# Patient Record
Sex: MALE | Race: WHITE
[De-identification: names, ages, dates, MRNs, and addresses within clinical notes are randomized per-mention and may not be internally consistent; named-entity substitution may affect disease eponyms.]

---

## 2019-11-09 ENCOUNTER — HOSPITAL ENCOUNTER (INPATIENT)
Dept: HOSPITAL 26 - MED | Age: 64
LOS: 3 days | DRG: 280 | End: 2019-11-12
Attending: GENERAL PRACTICE | Admitting: GENERAL PRACTICE
Payer: MEDICAID

## 2019-11-09 VITALS — WEIGHT: 134.13 LBS | HEIGHT: 66 IN | BODY MASS INDEX: 21.56 KG/M2

## 2019-11-09 VITALS — SYSTOLIC BLOOD PRESSURE: 102 MMHG | DIASTOLIC BLOOD PRESSURE: 69 MMHG

## 2019-11-09 VITALS — DIASTOLIC BLOOD PRESSURE: 68 MMHG | SYSTOLIC BLOOD PRESSURE: 101 MMHG

## 2019-11-09 DIAGNOSIS — I46.9: ICD-10-CM

## 2019-11-09 DIAGNOSIS — K72.90: ICD-10-CM

## 2019-11-09 DIAGNOSIS — K70.31: Primary | ICD-10-CM

## 2019-11-09 DIAGNOSIS — E03.9: ICD-10-CM

## 2019-11-09 DIAGNOSIS — D64.9: ICD-10-CM

## 2019-11-09 DIAGNOSIS — J69.0: ICD-10-CM

## 2019-11-09 DIAGNOSIS — E87.1: ICD-10-CM

## 2019-11-09 DIAGNOSIS — E43: ICD-10-CM

## 2019-11-09 DIAGNOSIS — R65.10: ICD-10-CM

## 2019-11-09 DIAGNOSIS — D62: ICD-10-CM

## 2019-11-09 DIAGNOSIS — N17.0: ICD-10-CM

## 2019-11-09 DIAGNOSIS — K76.6: ICD-10-CM

## 2019-11-09 DIAGNOSIS — K76.7: ICD-10-CM

## 2019-11-09 DIAGNOSIS — E80.6: ICD-10-CM

## 2019-11-09 DIAGNOSIS — F43.9: ICD-10-CM

## 2019-11-09 LAB
ALBUMIN FLD-MCNC: 2 G/DL (ref 3.4–5)
AMYLASE SERPL-CCNC: 42 U/L (ref 25–115)
ANION GAP SERPL CALCULATED.3IONS-SCNC: 15.2 MMOL/L (ref 8–16)
AST SERPL-CCNC: 67 U/L (ref 15–37)
BASOPHILS # BLD AUTO: 0.1 K/UL (ref 0–0.22)
BASOPHILS NFR BLD AUTO: 0.7 % (ref 0–2)
BILIRUB SERPL-MCNC: 3.2 MG/DL (ref 0–1)
BUN SERPL-MCNC: 37 MG/DL (ref 7–18)
CHLORIDE SERPL-SCNC: 95 MMOL/L (ref 98–107)
CO2 SERPL-SCNC: 25.3 MMOL/L (ref 21–32)
CREAT SERPL-MCNC: 2.1 MG/DL (ref 0.7–1.3)
EOSINOPHIL # BLD AUTO: 0.2 K/UL (ref 0–0.4)
EOSINOPHIL NFR BLD AUTO: 2.6 % (ref 0–4)
ERYTHROCYTE [DISTWIDTH] IN BLOOD BY AUTOMATED COUNT: 14.4 % (ref 11.6–13.7)
GFR SERPL CREATININE-BSD FRML MDRD: 41 ML/MIN (ref 90–?)
GLUCOSE SERPL-MCNC: 112 MG/DL (ref 74–106)
HCT VFR BLD AUTO: 27 % (ref 36–52)
HGB BLD-MCNC: 9.4 G/DL (ref 12–18)
IRON SERPL-MCNC: 70 UG/DL (ref 50–175)
LIPASE SERPL-CCNC: 119 U/L (ref 73–393)
LIPASE SERPL-CCNC: 123 U/L (ref 73–393)
LYMPHOCYTES # BLD AUTO: 1.5 K/UL (ref 2–11.5)
LYMPHOCYTES NFR BLD AUTO: 17.7 % (ref 20.5–51.1)
MAGNESIUM SERPL-MCNC: 1.9 MG/DL (ref 1.8–2.4)
MCH RBC QN AUTO: 38 PG (ref 27–31)
MCHC RBC AUTO-ENTMCNC: 35 G/DL (ref 33–37)
MCV RBC AUTO: 107.8 FL (ref 80–94)
MONOCYTES # BLD AUTO: 1.7 K/UL (ref 0.8–1)
MONOCYTES NFR BLD AUTO: 19 % (ref 1.7–9.3)
NEUTROPHILS # BLD AUTO: 5.2 K/UL (ref 1.8–7.7)
NEUTROPHILS NFR BLD AUTO: 60 % (ref 42.2–75.2)
PHOSPHATE SERPL-MCNC: 4 MG/DL (ref 2.5–4.9)
PLATELET # BLD AUTO: 157 K/UL (ref 140–450)
POTASSIUM SERPL-SCNC: 4.5 MMOL/L (ref 3.5–5.1)
PROTHROMBIN TIME: 16.7 SECS (ref 10.8–13.4)
RBC # BLD AUTO: 2.51 MIL/UL (ref 4.2–6.1)
SODIUM SERPL-SCNC: 131 MMOL/L (ref 136–145)
T4 FREE SERPL-MCNC: 0.65 NG/DL (ref 0.76–1.46)
TIBC SERPL-MCNC: 46 UG/DL (ref 250–450)
TSH SERPL DL<=0.05 MIU/L-ACNC: 51.92 UIU/ML (ref 0.34–3.74)
WBC # BLD AUTO: 8.7 K/UL (ref 4.8–10.8)

## 2019-11-09 PROCEDURE — P9016 RBC LEUKOCYTES REDUCED: HCPCS

## 2019-11-09 PROCEDURE — C9113 INJ PANTOPRAZOLE SODIUM, VIA: HCPCS

## 2019-11-09 PROCEDURE — C1758 CATHETER, URETERAL: HCPCS

## 2019-11-09 PROCEDURE — P9046 ALBUMIN (HUMAN), 25%, 20 ML: HCPCS

## 2019-11-09 RX ADMIN — SODIUM CHLORIDE SCH MLS/HR: 9 INJECTION, SOLUTION INTRAVENOUS at 21:00

## 2019-11-09 NOTE — NUR
PT ARRIVED TO ED WITH DAUGHTER FOR C/O RIGHT ABD PAIN, APPETTIE CHANGE, AND 
ALOC X 2 WEEKS. PT ABD IS DISTENDED, ASCITIC, ADN TENDERNESS ON RIGHT LOWER 
QUAD. BS ACTIVE IN ALL QUADS. DENIES ANY N,V,D,FVER, OR CONSTIPATION. VSS. LUNG 
SOUNDS CLEAR ON RIGHT SIDE AND DIMINSHED AND CRACKLES ON LEFT SIDE. RATES PAIN 
10/10. FOLLOWS COMMANDS. Sclera SHOWS MILD YELLOW DISCOLORATION. SKIN SHOWS 
MILD JAUNDICE. SKIN IS INTACT. SKIN SHOWS BURSING ON UPPER EXTREM. RIGHT LEG 
SHOWS +3 PITTING EDEMA AND +2 PITTING EDEMA AT THE LEFT ANKLE.



NKA.



PMH: ASCITIES, LIVER Cirrhosis.

## 2019-11-09 NOTE — NUR
-------------------------------------------------------------------------------

            *** Note undone in ED - 11/09/19 at 1839 by [facility] ***             

-------------------------------------------------------------------------------

PT TRANSFER TO CT VIA WHEELCHAIR.

## 2019-11-09 NOTE — NUR
RECEIVED BEDSIDE REPORT FROM ER RN. PT IS SLEEPING. RESPONDS TO NAME. DAUGHTER JENNIFER AT 
BEDSIDE. RESPIRATIONS ARE EQUAL AND UNLABORED ON ROOM ARE. LUNG SOUNDS ARE CLEAR. C/C ABD 
PAIN AND DISTENTION X2 WEEKS. PITTING EDEMA ON SIDDHARTH LEGS R>L. SKIN IS INTACT. PER DAUGHTER PT 
DOES NOT USE OXYGEN AT HOME AND ABLE TO AMBULATE. PT AMBULATED FROM GURNEY TO BED GAIT 
UNSTEADY. AAO X1 PERSON. IV ON  LAC 20G. US ABDOMEN PENDING. MRSA OBTAINED. VS: 102/69 HR 98 
97% RA RR 16. CALL LIGHT IS WITHIN REACH. POC DISCUSSED WITH PT AND FAMILY. WILL CONTINUE TO 
MONITOR.

## 2019-11-09 NOTE — NUR
PT IS SLEEPING COMFORTABLY IN BED. RESPIRATIONS ARE EQUAL AND UNLABORED. SAFETY MEASURES ARE 
IN PLACE.

## 2019-11-09 NOTE — NUR
VSS. ROHAN MEDICATIONS GIVEN PER ORDERS. PT TOLERATED WELL. BED ALARM ON AND LOWEST POSITION. 
CALL LIGHT IS WITHIN REACH.

## 2019-11-09 NOTE — NUR
Patient will be admitted to care of DR. MONTANEZ. Admited to TELE.  Will go to 
room 126B. Belongings list completed.  Report to MARU MAGANA.

## 2019-11-10 VITALS — SYSTOLIC BLOOD PRESSURE: 119 MMHG | DIASTOLIC BLOOD PRESSURE: 84 MMHG

## 2019-11-10 VITALS — SYSTOLIC BLOOD PRESSURE: 92 MMHG | DIASTOLIC BLOOD PRESSURE: 54 MMHG

## 2019-11-10 VITALS — SYSTOLIC BLOOD PRESSURE: 99 MMHG | DIASTOLIC BLOOD PRESSURE: 54 MMHG

## 2019-11-10 VITALS — SYSTOLIC BLOOD PRESSURE: 111 MMHG | DIASTOLIC BLOOD PRESSURE: 74 MMHG

## 2019-11-10 VITALS — DIASTOLIC BLOOD PRESSURE: 59 MMHG | SYSTOLIC BLOOD PRESSURE: 90 MMHG

## 2019-11-10 VITALS — DIASTOLIC BLOOD PRESSURE: 66 MMHG | SYSTOLIC BLOOD PRESSURE: 92 MMHG

## 2019-11-10 LAB
ANION GAP SERPL CALCULATED.3IONS-SCNC: 20.6 MMOL/L (ref 8–16)
ANION GAP SERPL CALCULATED.3IONS-SCNC: 20.8 MMOL/L (ref 8–16)
APPEARANCE FLD: CLEAR
APPEARANCE SPUN FLD: CLEAR
BASOPHILS # BLD AUTO: 0 K/UL (ref 0–0.22)
BASOPHILS NFR BLD AUTO: 0.3 % (ref 0–2)
BASOPHILS NFR BLD MANUAL: 0 % (ref 0–2)
BODY FLD TYPE: (no result)
BUN SERPL-MCNC: 42 MG/DL (ref 7–18)
BUN SERPL-MCNC: 44 MG/DL (ref 7–18)
CHLORIDE SERPL-SCNC: 94 MMOL/L (ref 98–107)
CHLORIDE SERPL-SCNC: 95 MMOL/L (ref 98–107)
CHOLEST/HDLC SERPL: 7.4 {RATIO} (ref 1–4.5)
CO2 SERPL-SCNC: 20.8 MMOL/L (ref 21–32)
CO2 SERPL-SCNC: 21.9 MMOL/L (ref 21–32)
COLOR FLD: (no result)
CREAT SERPL-MCNC: 3.1 MG/DL (ref 0.7–1.3)
CREAT SERPL-MCNC: 3.5 MG/DL (ref 0.7–1.3)
EOSINOPHIL # BLD AUTO: 0 K/UL (ref 0–0.4)
EOSINOPHIL NFR BLD AUTO: 0 % (ref 0–4)
EOSINOPHIL NFR BLD MANUAL: 0 % (ref 0–4)
ERYTHROCYTE [DISTWIDTH] IN BLOOD BY AUTOMATED COUNT: 14.5 % (ref 11.6–13.7)
ERYTHROCYTE [DISTWIDTH] IN BLOOD BY AUTOMATED COUNT: 14.6 % (ref 11.6–13.7)
GFR SERPL CREATININE-BSD FRML MDRD: 23 ML/MIN (ref 90–?)
GFR SERPL CREATININE-BSD FRML MDRD: 26 ML/MIN (ref 90–?)
GLUCOSE FLD-MCNC: 71 MG/DL
GLUCOSE SERPL-MCNC: 73 MG/DL (ref 74–106)
GLUCOSE SERPL-MCNC: 77 MG/DL (ref 74–106)
HCT VFR BLD AUTO: 28.2 % (ref 36–52)
HCT VFR BLD AUTO: 29.7 % (ref 36–52)
HDLC SERPL-MCNC: 8 MG/DL (ref 40–60)
HGB BLD-MCNC: 10 G/DL (ref 12–18)
HGB BLD-MCNC: 9.5 G/DL (ref 12–18)
LDLC SERPL CALC-MCNC: 38 MG/DL (ref 60–100)
LYMPHOCYTES # BLD AUTO: 0.8 K/UL (ref 2–11.5)
LYMPHOCYTES NFR BLD AUTO: 5.7 % (ref 20.5–51.1)
LYMPHOCYTES NFR BLD MANUAL: 4 % (ref 20–46)
MCH RBC QN AUTO: 36 PG (ref 27–31)
MCH RBC QN AUTO: 37 PG (ref 27–31)
MCHC RBC AUTO-ENTMCNC: 34 G/DL (ref 33–37)
MCHC RBC AUTO-ENTMCNC: 34 G/DL (ref 33–37)
MCV RBC AUTO: 108.3 FL (ref 80–94)
MCV RBC AUTO: 109.5 FL (ref 80–94)
MONOCYTES # BLD AUTO: 0.9 K/UL (ref 0.8–1)
MONOCYTES NFR BLD AUTO: 6.5 % (ref 1.7–9.3)
MONOCYTES NFR BLD MANUAL: 10 % (ref 5–12)
NEUTROPHILS # BLD AUTO: 11.7 K/UL (ref 1.8–7.7)
NEUTROPHILS NFR BLD AUTO: 87.5 % (ref 42.2–75.2)
NEUTROPHILS NFR FLD MANUAL: 33 %
PLATELET # BLD AUTO: 153 K/UL (ref 140–450)
PLATELET # BLD AUTO: 163 K/UL (ref 140–450)
POTASSIUM SERPL-SCNC: 4.7 MMOL/L (ref 3.5–5.1)
POTASSIUM SERPL-SCNC: 5.4 MMOL/L (ref 3.5–5.1)
RBC # BLD AUTO: 2.57 MIL/UL (ref 4.2–6.1)
RBC # BLD AUTO: 2.75 MIL/UL (ref 4.2–6.1)
RBC # FLD MANUAL: 396 /CU. MM.
SODIUM SERPL-SCNC: 131 MMOL/L (ref 136–145)
SODIUM SERPL-SCNC: 132 MMOL/L (ref 136–145)
SPECIMEN VOL FLD: 1050 ML
TRIGL SERPL-MCNC: 67 MG/DL (ref 30–150)
WBC # BLD AUTO: 13 K/UL (ref 4.8–10.8)
WBC # BLD AUTO: 13.3 K/UL (ref 4.8–10.8)
WBC # FLD MANUAL: 54 /CU. MM.

## 2019-11-10 PROCEDURE — 30233N1 TRANSFUSION OF NONAUTOLOGOUS RED BLOOD CELLS INTO PERIPHERAL VEIN, PERCUTANEOUS APPROACH: ICD-10-PCS

## 2019-11-10 PROCEDURE — 0W9G3ZZ DRAINAGE OF PERITONEAL CAVITY, PERCUTANEOUS APPROACH: ICD-10-PCS

## 2019-11-10 RX ADMIN — SODIUM CHLORIDE PRN MG: 9 INJECTION, SOLUTION INTRAVENOUS at 05:53

## 2019-11-10 RX ADMIN — SODIUM CHLORIDE SCH MLS/HR: 9 INJECTION, SOLUTION INTRAVENOUS at 19:05

## 2019-11-10 RX ADMIN — DEXTROSE SCH MLS/HR: 50 INJECTION, SOLUTION INTRAVENOUS at 21:15

## 2019-11-10 RX ADMIN — TAMSULOSIN HYDROCHLORIDE SCH MG: 0.4 CAPSULE ORAL at 15:32

## 2019-11-10 RX ADMIN — LEVOTHYROXINE SODIUM SCH MG: 50 TABLET ORAL at 06:23

## 2019-11-10 NOTE — NUR
RECEIVED REPORT FROM NIGHT NURSE. PATIENT IS LYING IN BED ASLEEP, ABLE TO WAKE. IV INTACT 
AND PATENT TO LEFT AC. IVF NS INFUSING AT 30ML/HR. NO S/S OF DISTRESS NOTED. BED IN LOW 
POSITION, SAFETY MEASURES IN PLACE. CALL LIGHT WITHIN REACH.

## 2019-11-10 NOTE — NUR
ULTRASOUND GUIDED PARACENTESIS PERFORMED BY DR. CANO. CONSENT AND TIME OUT FORM DONE. 
MORPHINE AND ATIVAN GIVEN AS ORDERED NOW. WILL MONITOR PATIENT.

## 2019-11-10 NOTE — NUR
VSS: 99/54  98%RA RR 18 97.6. NOTED SIDDHARTH LOWER EXTREMITY PITTING EDEMA R>L NO CHANGE 
NOTED FROM YESTERDAY. BLADDER SCAN REVEALED 600CC  BUT NOT ACCURATE D/T ASCITES. MD MADE 
AWARE WILL ORDER CONSULT WITH DR GALEANO FOR MENDOZA INSERTION.  ALSO MD MADE AWARE OF LACTIC 
ACID TRENDING UP 7.2 H.  TO SEE PATIENT.

## 2019-11-10 NOTE — NUR
ADMINISTERED CA CHLORIDE NOW INFUSING PER ORDERS. ADMINISTERED INSULIN. BLOOD SUGAR . 
SAFETY MEASURES ARE IN PLACE. WILL CONTINUE TO MONITOR. 

-------------------------------------------------------------------------------

Addendum: 11/10/19 at 2244 by Soni Bae RN

-------------------------------------------------------------------------------

## 2019-11-10 NOTE — NUR
NOTIFIED DR. CANO, IN REGARDS TO UNSUCCESSFUL INSERTION OF MENDOZA CATHETER. RESISTANCE MET 
DURING INSERTION. DR. CANO TO REFER TO UROLOGY CONSULT.

## 2019-11-10 NOTE — NUR
DR. AGUIRRE NOTIFIED OF PATIENT'S LACTIC ACID 6.6 AND UNSUCCESSFUL URINARY STRAIGHT CATHETER. 
 WILL ORDER AND SEE PATIENT.

## 2019-11-10 NOTE — NUR
PATIENT POTASSIUM HIGH ORDERS TO ADMINISTER INSULIN. ADMINISTERED DEXTROSE PER ORDERS IVP 
WILL F/U WITH ACCU CHECK BEFORE ADMINISTERING INSULIN.

## 2019-11-10 NOTE — NUR
DR GALEANO ATTEMPT TO INSERT MENDOZA CATH THERE WAS RESISTANCE. HE THEN FOLLOWED TO USE 
UROLOGIST TRAY AND INSERT CATHETER THERE  CC URINE OUTPUT DARK ORANGE/ BLOOD TINGE. 
PT TOLERATED WELL. SAFETY MEASURES ARE IN PLACE. WILL CONTINUE TO MONITOR.  

-------------------------------------------------------------------------------

Addendum: 11/11/19 at 0107 by Soni Bae RN

-------------------------------------------------------------------------------

THERE  CC URINE OUTPUT

## 2019-11-10 NOTE — NUR
RECEIVED BEDSIDE REPORT. PT IS LAYING IN BED WITH EYES CLOSED RESPIRATIONS ARE EQUAL AND 
UNLABORED ON RA. DAY RN IS GOING TO START ALBUMIN PER ORDERS IV ON LAC 20G. ALSO DAY RN 
TRIED TO GIVE LACTULOSE BUT PT UNRESPONSIVE OPEN EYES BUT NOT TALKING HIGH RISK OF 
ASPIRATION. PT HAD A PARACENTESIS TODAY 1050L OUTPUT AT 1620 ORDER TO SEND FLUID TO LAB 
CURRENTLY IN ROOM. ORDER FOR MENDOZA CATH D/T NO URINE OUTPUT IN OVER 24H. NURSES ATTEMPT X4 
WILL F/U WITH DOCTOR.  FAMILY IS AT BEDSIDE. POC DISCUSSED WITH PT AND FAMILY. SAFETY 
MEASURES ARE IN PLACE. WILL ROUND FREQUENTLY.

## 2019-11-10 NOTE — NUR
MENDOZA CATHETER RE INSERTION ATTEMPTED, WITH DR. CANO AT BEDSIDE. UNABLE TO INSERT CATHETER AT 
THIS TIME. WILL REFER TO UROLOGIST.

## 2019-11-10 NOTE — NUR
STATUS POST PARACENTESIS. OUTPUT 1050 ML OF FLUID. PATIENT IS KEPT COMFORTABLE DURING 
PROCEDURE. WILL CONTINUE TO MONITOR PATIENT.

## 2019-11-10 NOTE — NUR
VITAL SIGNS ARE WITHIN NORMAL LIMITS. ALL SAFETY MEASURES ARE IN PLACE. CALL LIGHT IS WITHIN 
REACH.

## 2019-11-11 VITALS — SYSTOLIC BLOOD PRESSURE: 93 MMHG | DIASTOLIC BLOOD PRESSURE: 56 MMHG

## 2019-11-11 VITALS — SYSTOLIC BLOOD PRESSURE: 105 MMHG | DIASTOLIC BLOOD PRESSURE: 59 MMHG

## 2019-11-11 VITALS — SYSTOLIC BLOOD PRESSURE: 94 MMHG | DIASTOLIC BLOOD PRESSURE: 57 MMHG

## 2019-11-11 VITALS — SYSTOLIC BLOOD PRESSURE: 101 MMHG | DIASTOLIC BLOOD PRESSURE: 56 MMHG

## 2019-11-11 VITALS — DIASTOLIC BLOOD PRESSURE: 52 MMHG | SYSTOLIC BLOOD PRESSURE: 86 MMHG

## 2019-11-11 VITALS — DIASTOLIC BLOOD PRESSURE: 62 MMHG | SYSTOLIC BLOOD PRESSURE: 94 MMHG

## 2019-11-11 VITALS — DIASTOLIC BLOOD PRESSURE: 57 MMHG | SYSTOLIC BLOOD PRESSURE: 96 MMHG

## 2019-11-11 VITALS — DIASTOLIC BLOOD PRESSURE: 55 MMHG | SYSTOLIC BLOOD PRESSURE: 107 MMHG

## 2019-11-11 VITALS — SYSTOLIC BLOOD PRESSURE: 100 MMHG | DIASTOLIC BLOOD PRESSURE: 47 MMHG

## 2019-11-11 VITALS — DIASTOLIC BLOOD PRESSURE: 59 MMHG | SYSTOLIC BLOOD PRESSURE: 92 MMHG

## 2019-11-11 VITALS — DIASTOLIC BLOOD PRESSURE: 71 MMHG | SYSTOLIC BLOOD PRESSURE: 98 MMHG

## 2019-11-11 VITALS — SYSTOLIC BLOOD PRESSURE: 118 MMHG | DIASTOLIC BLOOD PRESSURE: 94 MMHG

## 2019-11-11 VITALS — DIASTOLIC BLOOD PRESSURE: 57 MMHG | SYSTOLIC BLOOD PRESSURE: 81 MMHG

## 2019-11-11 LAB
ANION GAP SERPL CALCULATED.3IONS-SCNC: 17.1 MMOL/L (ref 8–16)
APPEARANCE UR: CLEAR
BARBITURATES UR QL SCN: (no result) NG/ML
BASOPHILS # BLD AUTO: 0 K/UL (ref 0–0.22)
BASOPHILS NFR BLD AUTO: 0.4 % (ref 0–2)
BENZODIAZ UR QL SCN: (no result) NG/ML
BILIRUB UR QL STRIP: (no result)
BUN SERPL-MCNC: 46 MG/DL (ref 7–18)
BZE UR QL SCN: (no result) NG/ML
CANNABINOIDS UR QL SCN: (no result) NG/ML
CHLORIDE SERPL-SCNC: 96 MMOL/L (ref 98–107)
CO2 SERPL-SCNC: 23.7 MMOL/L (ref 21–32)
COLOR UR: (no result)
CREAT SERPL-MCNC: 3.8 MG/DL (ref 0.7–1.3)
EOSINOPHIL # BLD AUTO: 0.1 K/UL (ref 0–0.4)
EOSINOPHIL NFR BLD AUTO: 0.8 % (ref 0–4)
ERYTHROCYTE [DISTWIDTH] IN BLOOD BY AUTOMATED COUNT: 14.7 % (ref 11.6–13.7)
ERYTHROCYTE [DISTWIDTH] IN BLOOD BY AUTOMATED COUNT: 19 % (ref 11.6–13.7)
FOLATE SERPL-MCNC: 5.2 NG/ML (ref 3–?)
GFR SERPL CREATININE-BSD FRML MDRD: 21 ML/MIN (ref 90–?)
GLUCOSE SERPL-MCNC: 92 MG/DL (ref 74–106)
GLUCOSE UR STRIP-MCNC: NEGATIVE MG/DL
HCT VFR BLD AUTO: 20.8 % (ref 36–52)
HCT VFR BLD AUTO: 28.3 % (ref 36–52)
HGB BLD-MCNC: 7.3 G/DL (ref 12–18)
HGB BLD-MCNC: 9.7 G/DL (ref 12–18)
HGB UR QL STRIP: (no result)
HYALINE CASTS URNS QL MICRO: (no result) /LPF
LEUKOCYTE ESTERASE UR QL STRIP: NEGATIVE
LYMPHOCYTES # BLD AUTO: 1.3 K/UL (ref 2–11.5)
LYMPHOCYTES NFR BLD AUTO: 15.2 % (ref 20.5–51.1)
LYMPHOCYTES NFR BLD MANUAL: 12 % (ref 20–46)
MAGNESIUM SERPL-MCNC: 2 MG/DL (ref 1.8–2.4)
MCH RBC QN AUTO: 35 PG (ref 27–31)
MCH RBC QN AUTO: 38 PG (ref 27–31)
MCHC RBC AUTO-ENTMCNC: 34 G/DL (ref 33–37)
MCHC RBC AUTO-ENTMCNC: 35 G/DL (ref 33–37)
MCV RBC AUTO: 102.2 FL (ref 80–94)
MCV RBC AUTO: 107.7 FL (ref 80–94)
MONOCYTES # BLD AUTO: 1.4 K/UL (ref 0.8–1)
MONOCYTES NFR BLD AUTO: 16.2 % (ref 1.7–9.3)
MONOCYTES NFR BLD MANUAL: 3 % (ref 5–12)
NEUTROPHILS # BLD AUTO: 5.8 K/UL (ref 1.8–7.7)
NEUTROPHILS NFR BLD AUTO: 67.4 % (ref 42.2–75.2)
NITRITE UR QL STRIP: NEGATIVE
NRBC BLD MANUAL-RTO: 1 /100WBC (ref 0–0)
OPIATES UR QL SCN: (no result) NG/ML
PCP UR QL SCN: (no result) NG/ML
PH UR STRIP: 5 [PH] (ref 5–9)
PHOSPHATE SERPL-MCNC: 4.9 MG/DL (ref 2.5–4.9)
PLATELET # BLD AUTO: 126 K/UL (ref 140–450)
PLATELET # BLD AUTO: 129 K/UL (ref 140–450)
POTASSIUM SERPL-SCNC: 4.8 MMOL/L (ref 3.5–5.1)
RBC # BLD AUTO: 1.93 MIL/UL (ref 4.2–6.1)
RBC # BLD AUTO: 2.77 MIL/UL (ref 4.2–6.1)
RBC #/AREA URNS HPF: (no result) /HPF (ref 0–5)
SODIUM SERPL-SCNC: 132 MMOL/L (ref 136–145)
WBC # BLD AUTO: 10.1 K/UL (ref 4.8–10.8)
WBC # BLD AUTO: 8.6 K/UL (ref 4.8–10.8)
WBC,URINE: (no result) /HPF (ref 0–5)

## 2019-11-11 RX ADMIN — DEXTROSE SCH MLS/HR: 50 INJECTION, SOLUTION INTRAVENOUS at 13:20

## 2019-11-11 RX ADMIN — MIDODRINE HYDROCHLORIDE SCH MG: 5 TABLET ORAL at 18:48

## 2019-11-11 RX ADMIN — LEVOTHYROXINE SODIUM SCH MG: 50 TABLET ORAL at 06:30

## 2019-11-11 RX ADMIN — IPRATROPIUM BROMIDE AND ALBUTEROL SULFATE SCH ML: .5; 3 SOLUTION RESPIRATORY (INHALATION) at 13:23

## 2019-11-11 RX ADMIN — METOCLOPRAMIDE SCH MG: 5 INJECTION, SOLUTION INTRAMUSCULAR; INTRAVENOUS at 18:48

## 2019-11-11 RX ADMIN — SODIUM CHLORIDE SCH MLS/HR: 9 INJECTION, SOLUTION INTRAVENOUS at 18:48

## 2019-11-11 RX ADMIN — MIDODRINE HYDROCHLORIDE SCH MG: 5 TABLET ORAL at 06:47

## 2019-11-11 RX ADMIN — LACTULOSE SCH GM: 10 SOLUTION ORAL at 16:17

## 2019-11-11 RX ADMIN — PANTOPRAZOLE SODIUM SCH MG: 40 INJECTION, POWDER, FOR SOLUTION INTRAVENOUS at 21:08

## 2019-11-11 RX ADMIN — MIDODRINE HYDROCHLORIDE SCH MG: 5 TABLET ORAL at 15:36

## 2019-11-11 RX ADMIN — DEXTROSE SCH MLS/HR: 50 INJECTION, SOLUTION INTRAVENOUS at 04:06

## 2019-11-11 RX ADMIN — LACTULOSE SCH GM: 10 SOLUTION ORAL at 21:09

## 2019-11-11 RX ADMIN — METOCLOPRAMIDE SCH MG: 5 INJECTION, SOLUTION INTRAMUSCULAR; INTRAVENOUS at 23:40

## 2019-11-11 NOTE — NUR
11/11/19 RD INITIAL ASSESSMENT COMPLETED



PLEASE REFER TO NUTRITION ASSESSMENT UNDER CARE ACTIVITY FOR ESTIMATED NUTRITIONAL NEEDS. 



1. RECOMMEND NEPRO @45 ML/HR 

-THIS WILL PROVIDE 1080 ML OF VOLUME, 1944 KCAL AND 87.5 GM OF PROTEIN WHICH MEETS 100% OF 
ESTIMATED NEEDS

2. RECOMMEND FREE WATER FLUSH 55 ML Q6H

3. IF/WHEN PATIENT BECOMES ALERT AND CONSCIOUS, RECOMMEND A SWALLOW EVALUATION TO ADVANCE TO 
A PO DIET

4. RD TO FOLLOW-UP 2-3 DAYS, HIGH RISK 



JENNIFER GAMEZ RD

## 2019-11-11 NOTE — NUR
ORAL CARE PROVIDED, PATIENT IS MOANING/GROANING AND SHUTTING MOUTH-WITHDRAWING TO LIGHT 
PAIN. PATIENT CANNOT OPEN EYES OR FOLLOW SIMPLE COMMANDS.

## 2019-11-11 NOTE — NUR
PT TAKEN TO ICU FOR CLOSER OBSERVATION. PT TO BE TRANSFERRED TO OTHER FACILITY FOR HIGHER 
LEVEL OF CARE. PT FAMILY STATED THAT PT BREATHING WAS BETTER YESTERDAY. MD NOTIFIED OF 
CHANGES. DR. HERRMANN SO=POKE WITH FAMILY OF PT CONDITION. ENDORSED TO ICU NURSE KANDICE FOR 
CONTINUITY OF CARE. PT VITALS STABLE UPON TRANSFER TO ICU AND HR/O2 SAT STABLE ON ROUTE TO 
ICU.

## 2019-11-11 NOTE — NUR
BLOOD TRANSFUSION COMPLETE, LINE FLUSHED WITH NORMAL SALINE, IV SITE FLUSHED AND SALINE 
LOCKED, BLOOD BAG AND LINES DISCARDED VIA BIOHAZARD WASTE. NO SIGNS OF REACTION.

## 2019-11-11 NOTE — NUR
D/T ALOC AND LETHARGIC HELD ROHAN PO MEDICATIONS. WILL INFORM DOCTOR. SAFETY MEASURES ARE IN 
PLACE. WILL CONTINUE TO MONITOR.

## 2019-11-11 NOTE — NUR
PATIENT HAS BEEN SCREENED AND CATEGORIZED AS HIGH NUTRITION RISK. PATIENT WILL BE SEEN 
WITHIN 1-2 DAYS OF ADMISSION.



11/11/19



JENNIFER GAMEZ RD

## 2019-11-11 NOTE — NUR
Assessment/Discharge Plan



High Risk DC Screen 

Yes

 Name: 

Jillian Torres

Home Tel: 

[phone]

Relationship: 

daughter

Pre-Admission Living Arrangements: 

Lives with Other

Other: 

daughter: Meghna Wall

Prior ADL 

 Independent

Current Home Health Name/Tel: 

N/A

Current DME/02 Name/Tel: 

N/A

Current Hospice Name/Tel: 

N/A

Current Dialysis Name/Tel: 

N/A

Healthcare Decision Maker: 

Patient

Advance Directive 

No

Tentative Discharge Plan Summary: 

Patient is a 64 year old male with history of cirrhosis. I met with 

patient's family (patient's daughters Susanne Wall and Jillian Torres; 

patient's wife Dot Wall). Due to patient's condition, he is not able 

to participate during assessment. Patient lives at home with his daughter 

Meghna. Patient's family reported they were informed by MD patient has 

poor prognosis. Patient's pcp is Brittney Pringle ([place]). Prior to 

hospital admission patient was independent with ADLs and did not use any 

DME. Patient's family stated patient does not have an Advance Directive. 

They are aware of MD's order for higher of level care transfer. I provided 

them with my contact information.  and/or  will 

follow up as needed.   

 Signature: 

Elizabeth Reyes, MSW

Date: 

Nov 11, 2019

## 2019-11-11 NOTE — NUR
IN TO CHECK PT. UPDATED PT'S CONDITION. NOTIFIED PT DOES NOT HAVE URINE DAYSHIFT. 
NIGHT SHIFT URINE OUTPUT 120 CC TOTAL. DR. UGALDE TALKING TO PT'S FAMILY AT BEDSIDE.

## 2019-11-11 NOTE — NUR
PT STILL UNRESPONSIVE TO NAME OR LIGHT PAIN STIMULI. ON O2 NC 2L/MIN, O2 SATS 95-98%. 
BEDSIDE MONITOR STILL SHOWS ST 103S. FAMILY AT BEDSIDE.

## 2019-11-11 NOTE — NUR
NOTIFIED DR. MARTINS PT BP 86/52, MAP SHOWS 68 ON MONITOR. PER DR. MARTINS, CALL HIM ONCE PT MAP 
LESS THAN 60.

## 2019-11-11 NOTE — NUR
AIR CHECK/ AUSCULTATION COMPLETE FOR NGT PLACEMENT, SCHEDULED MEDICATIONS ADMINISTERED, NO 
RESIDUALS AT THIS TIME, PATIENT TOLERATING FEEDING, TUBE FEEDING INCREASED TO 40ML/HR.

## 2019-11-11 NOTE — NUR
DC PLANNING 

64 YRS OLD MALE PT WAS ADMITTED FROM HOME WITH A DX OF ABDOMINAL PAIN AND CIRRHOSIS, AND 
LETHARGIC. ULTRA SOUND OF ABDOMEN LARGE TO MODERATE SIZE ASCITES , PARACENTESIS DONE . IV 
ZOSYN IVPB GIVEN . D/C PLAN TO TRANSFER TO HIGHER LEVEL OF CARE FOR HEPATORENAL SYNDROME FOR 
LIVER SPECIALIST. FAXED TO [facility] AND [facility].  

-------------------------------------------------------------------------------

Addendum: 11/12/19 at 1655 by Tami Ernandez CM

-------------------------------------------------------------------------------

DC PLANNING 11/11/12 1300 

RECEIVED A CALL FROM [facility] SPOKE WITH TANI , STATED NO LIVER SPECIALIST ACCEPTED THE 
PT AT THIS TIME AND ASKING WHETHER NEEDS LIVER TRANSPLANT.

## 2019-11-11 NOTE — NUR
TRANSFERRED PT FROM FLOOR TO ICU BY BRITTNEY. PT IS UNRESPONSIVE TO NAME STIMULI. DOES NOT 
OPEN EYES. BEDSIDE MONITOR SHOWS [id number]. LUNG SOUND WHEEZING. O2 SATS SHOWS 82%, GAVE OXY 
6L/MIN INCREASED TO 92-97%. ABD SOFT. PER FLOOR NURSE, PT HAD PARACENTESIS OUTPUT 1050 
YESTERDAY.  PT HAS IV TO LEFT AC # 20. F/C IN PLACE, NO URINE NOTED IN URINE BAG. PT RLE HAS 
EDEMA NOTED. AFTER TRANSFERRED PT TO ROOM, PT HAD SMALL AMOUNT OF VOMITING( COFFEE GROUND), 
SUCTIONED PT.HOB ELEVATED 30 DEGREES WITH LOW BED POSITION, WILL CONTINUE TO MONITOR PT. 

-------------------------------------------------------------------------------

Addendum: 11/11/19 at 1035 by Maurice Villagran RN

-------------------------------------------------------------------------------

PT BP 87/55 WHEN PT GOT TO ICU.

## 2019-11-11 NOTE — NUR
RECEIVED BEDSIDE REPORT. PT IS LAYING IN BED WITH EYES CLOSED RESPIRATIONS LOUD PT MAKING 
SNORING NOISES. O2 AT 95% ON RA. PT UNRESPONSIVE TO NAME AND ONLY MAKES EYE MOVEMENTS TO 
PAIN. PT HAD A PARACENTESIS YESTERDAY WITH 1050L OUTPUT. MENDOZA INSERTED BY UROLOGIST LAST 
NIGHT. PT ONLY HAS  OUTPUT SO FAR. MD MADE AWARE OF CHANGES IN LOC PER NIGHT SHIFT. 
PT IV IN THE L FA 20G INFUSING 50ML/HR. PT ON A CCHO 60GM DIET BUT PT UNABLE TO EAT DUE TO 
ASPIRATION POSSIBLE. WILL CONTINUE TO ROUND ON PT. BED IN LOW POSITION, CALL LIGHT WITHIN 
REACH.

## 2019-11-11 NOTE — NUR
VSS : 118/94,  RR 20 97% RA. RECHECKED . PT REMAINS ALOC. OPENS EYES TO VOICE 
BUT NONVERBAL. WILL CONTINUE TO MONITOR.

## 2019-11-11 NOTE — NUR
RECEIVED PATIENT IN BED, DOES NOT RESPOND TO VOICE OR STERNAL RUB, ANOx0. LETHARGIC, EYES 
ARE REACTIVE, SLUGGISH 3MM, PERRL. SKIN WARM AND DRY, AFEBRILE. NASAL CANNULA IN PLACE TO 
2LPM, SATURATION GREATER THAN 95%, RESPIRATIONS ARE EVEN AND UNLABORED, RR 20. LUNG SOUNDS 
ARE DIMINISHED. S1,S2 SINUS TACHYCARDIA ON MONITOR, HEART RATE 105 BPM, BLOOD PRESSURE 
94/62. BLOOD TRANSFUSION CURRENTLY IN PLACE, LEFT AC PERIPHERAL IV, 20 G, FLUSHED AND 
PATENT, INFUSING BLOOD, LEFT FOREARM PERIPHERAL IV 22G, FLUSHED WITHOUT SYMPTOMS, INFUSING 
0.45 NS @ 50ML/HR. ABDOMEN IS FIRM AND DISTENDED, BOWEL SOUNDS ARE ACTIVE, RECTAL TUBE IN 
PLACE. MENDOZA CATHETER IN PLACE. LOWER EXTREMITIES ARE EDEMATOUS +1 PITTING. BED IS LOCKED 
AND IN LOW POSITION, SIDERAILS UP, CALL LIGHT WITHIN REACH. WILL CONTINUE TO MONITOR

-------------------------------------------------------------------------------

Addendum: 11/11/19 at 2232 by Jazmin Crystal RN

-------------------------------------------------------------------------------

INFUSING SANDOSTATIN @ 10ML/HR

## 2019-11-11 NOTE — NUR
VITAL SIGNS ARE STABLE; 101/56  92% RA RR 20 TEMP 97 .6. WALKED INTO ROOM PT HAD BLANK 
STARE WAS STARING AT CEILING. CALLED OUT PT'S NAME NO REACTION. SHOOK PT SHOULDER AND OPEN 
EYES WIDER. NO VERBAL RESPONSE OR LOOKED AT ME. CONTINUES TO STARE AT CEILING. MD MADE 
AWARE. PER DOCTOR WILL TREND LABS IN THE AM. WILL CONTINUE TO MONITOR CLOSELY.

## 2019-11-12 VITALS — DIASTOLIC BLOOD PRESSURE: 62 MMHG | SYSTOLIC BLOOD PRESSURE: 99 MMHG

## 2019-11-12 VITALS — SYSTOLIC BLOOD PRESSURE: 104 MMHG | DIASTOLIC BLOOD PRESSURE: 66 MMHG

## 2019-11-12 VITALS — DIASTOLIC BLOOD PRESSURE: 73 MMHG | SYSTOLIC BLOOD PRESSURE: 101 MMHG

## 2019-11-12 VITALS — SYSTOLIC BLOOD PRESSURE: 112 MMHG | DIASTOLIC BLOOD PRESSURE: 84 MMHG

## 2019-11-12 VITALS — DIASTOLIC BLOOD PRESSURE: 70 MMHG | SYSTOLIC BLOOD PRESSURE: 216 MMHG

## 2019-11-12 VITALS — SYSTOLIC BLOOD PRESSURE: 130 MMHG | DIASTOLIC BLOOD PRESSURE: 72 MMHG

## 2019-11-12 VITALS — SYSTOLIC BLOOD PRESSURE: 130 MMHG | DIASTOLIC BLOOD PRESSURE: 79 MMHG

## 2019-11-12 VITALS — SYSTOLIC BLOOD PRESSURE: 99 MMHG | DIASTOLIC BLOOD PRESSURE: 63 MMHG

## 2019-11-12 VITALS — DIASTOLIC BLOOD PRESSURE: 71 MMHG | SYSTOLIC BLOOD PRESSURE: 111 MMHG

## 2019-11-12 VITALS — DIASTOLIC BLOOD PRESSURE: 76 MMHG | SYSTOLIC BLOOD PRESSURE: 184 MMHG

## 2019-11-12 VITALS — DIASTOLIC BLOOD PRESSURE: 80 MMHG | SYSTOLIC BLOOD PRESSURE: 134 MMHG

## 2019-11-12 VITALS — SYSTOLIC BLOOD PRESSURE: 173 MMHG | DIASTOLIC BLOOD PRESSURE: 75 MMHG

## 2019-11-12 LAB
ANION GAP SERPL CALCULATED.3IONS-SCNC: 16.2 MMOL/L (ref 8–16)
ANION GAP SERPL CALCULATED.3IONS-SCNC: 17.9 MMOL/L (ref 8–16)
BUN SERPL-MCNC: 49 MG/DL (ref 7–18)
BUN SERPL-MCNC: 50 MG/DL (ref 7–18)
CHLORIDE SERPL-SCNC: 100 MMOL/L (ref 98–107)
CHLORIDE SERPL-SCNC: 98 MMOL/L (ref 98–107)
CO2 SERPL-SCNC: 24.4 MMOL/L (ref 21–32)
CO2 SERPL-SCNC: 24.4 MMOL/L (ref 21–32)
CREAT SERPL-MCNC: 4.7 MG/DL (ref 0.7–1.3)
CREAT SERPL-MCNC: 4.9 MG/DL (ref 0.7–1.3)
EOSINOPHIL NFR BLD MANUAL: 3 % (ref 0–4)
ERYTHROCYTE [DISTWIDTH] IN BLOOD BY AUTOMATED COUNT: 19.3 % (ref 11.6–13.7)
ERYTHROCYTE [DISTWIDTH] IN BLOOD BY AUTOMATED COUNT: 19.7 % (ref 11.6–13.7)
ERYTHROCYTE [DISTWIDTH] IN BLOOD BY AUTOMATED COUNT: 19.8 % (ref 11.6–13.7)
FERRITIN SERPL-MCNC: 1412 NG/ML (ref 30–400)
GFR SERPL CREATININE-BSD FRML MDRD: 16 ML/MIN (ref 90–?)
GFR SERPL CREATININE-BSD FRML MDRD: 16 ML/MIN (ref 90–?)
GLUCOSE SERPL-MCNC: 151 MG/DL (ref 74–106)
GLUCOSE SERPL-MCNC: 194 MG/DL (ref 74–106)
HCT VFR BLD AUTO: 27.6 % (ref 36–52)
HCT VFR BLD AUTO: 27.7 % (ref 36–52)
HCT VFR BLD AUTO: 29.1 % (ref 36–52)
HGB BLD-MCNC: 10 G/DL (ref 12–18)
HGB BLD-MCNC: 9.6 G/DL (ref 12–18)
HGB BLD-MCNC: 9.6 G/DL (ref 12–18)
LYMPHOCYTES NFR BLD MANUAL: 12 % (ref 20–46)
LYMPHOCYTES NFR BLD MANUAL: 17 % (ref 20–46)
LYMPHOCYTES NFR BLD MANUAL: 18 % (ref 20–46)
MAGNESIUM SERPL-MCNC: 2 MG/DL (ref 1.8–2.4)
MCH RBC QN AUTO: 35 PG (ref 27–31)
MCHC RBC AUTO-ENTMCNC: 34 G/DL (ref 33–37)
MCHC RBC AUTO-ENTMCNC: 35 G/DL (ref 33–37)
MCHC RBC AUTO-ENTMCNC: 35 G/DL (ref 33–37)
MCV RBC AUTO: 101 FL (ref 80–94)
MCV RBC AUTO: 101.9 FL (ref 80–94)
MCV RBC AUTO: 102.4 FL (ref 80–94)
MONOCYTES NFR BLD MANUAL: 10 % (ref 5–12)
MONOCYTES NFR BLD MANUAL: 10 % (ref 5–12)
MONOCYTES NFR BLD MANUAL: 15 % (ref 5–12)
NRBC BLD MANUAL-RTO: 1 /100WBC (ref 0–0)
NRBC BLD MANUAL-RTO: 1 /100WBC (ref 0–0)
PHOSPHATE SERPL-MCNC: 3.5 MG/DL (ref 2.5–4.9)
PLATELET # BLD AUTO: 118 K/UL (ref 140–450)
PLATELET # BLD AUTO: 122 K/UL (ref 140–450)
PLATELET # BLD AUTO: 126 K/UL (ref 140–450)
POTASSIUM SERPL-SCNC: 4.3 MMOL/L (ref 3.5–5.1)
POTASSIUM SERPL-SCNC: 4.6 MMOL/L (ref 3.5–5.1)
PROTHROMBIN TIME: 20.3 SECS (ref 10.8–13.4)
RBC # BLD AUTO: 2.71 MIL/UL (ref 4.2–6.1)
RBC # BLD AUTO: 2.73 MIL/UL (ref 4.2–6.1)
RBC # BLD AUTO: 2.86 MIL/UL (ref 4.2–6.1)
SODIUM SERPL-SCNC: 134 MMOL/L (ref 136–145)
SODIUM SERPL-SCNC: 138 MMOL/L (ref 136–145)
VIT B12 SERPL-MCNC: > 2000 PG/ML (ref 211–946)
WBC # BLD AUTO: 8.7 K/UL (ref 4.8–10.8)
WBC # BLD AUTO: 9 K/UL (ref 4.8–10.8)
WBC # BLD AUTO: 9.5 K/UL (ref 4.8–10.8)

## 2019-11-12 RX ADMIN — SODIUM CHLORIDE SCH MLS/HR: 9 INJECTION, SOLUTION INTRAVENOUS at 01:28

## 2019-11-12 RX ADMIN — MIDODRINE HYDROCHLORIDE SCH MG: 5 TABLET ORAL at 13:27

## 2019-11-12 RX ADMIN — PANTOPRAZOLE SODIUM SCH MG: 40 INJECTION, POWDER, FOR SOLUTION INTRAVENOUS at 08:18

## 2019-11-12 RX ADMIN — METOCLOPRAMIDE SCH MG: 5 INJECTION, SOLUTION INTRAMUSCULAR; INTRAVENOUS at 11:35

## 2019-11-12 RX ADMIN — LACTULOSE SCH GM: 10 SOLUTION ORAL at 08:17

## 2019-11-12 RX ADMIN — IPRATROPIUM BROMIDE AND ALBUTEROL SULFATE SCH ML: .5; 3 SOLUTION RESPIRATORY (INHALATION) at 13:00

## 2019-11-12 RX ADMIN — SODIUM CHLORIDE PRN MG: 9 INJECTION, SOLUTION INTRAVENOUS at 13:27

## 2019-11-12 RX ADMIN — LACTULOSE SCH GM: 10 SOLUTION ORAL at 13:27

## 2019-11-12 RX ADMIN — MIDODRINE HYDROCHLORIDE SCH MG: 5 TABLET ORAL at 06:34

## 2019-11-12 RX ADMIN — METOCLOPRAMIDE SCH MG: 5 INJECTION, SOLUTION INTRAMUSCULAR; INTRAVENOUS at 06:34

## 2019-11-12 NOTE — NUR
DR MONTENEGRO DISCUSSED CODE STATUS WITH PT'S DAUGHTER AND FAMILY. FAMILY DECIDED WITH DNR AND 
COMFORT MEASURES ONLY.

## 2019-11-12 NOTE — NUR
TALKED FOR PT DAUGHTER, PT'S WIFE AND OTHER FAMILY MEMBERS. [facility] CELL [phone]. FAX [phone]

## 2019-11-12 NOTE — NUR
G TUBE RESIDUAL 450ML, MADE DR MONTENEGRO AWARE. ASKED DR MONTENEGRO IF WE SHOULD PUT ALL OF IT BACK, DR MONTENEGRO AGREED.

## 2019-11-12 NOTE — NUR
RECEIVED BEDSIDE REPORT FROM NIGHT SHIFT RNTERESA. PT IN BED, RESPIRATIONS EVEN AND 
UNLABORED ON 2L NC. ST ON MONITOR. LUNG SOUNDS CLEAR, DIMINISHED. PT MAKING GROANING SOUND. 
ABD SOUNDS ACTIVE, RECTAL TUBE IN PLACE. PT IS OX0, NOT OPENING EYES. NOT RESPONDING TO 
LIGHT PAIN OR STERNAL RUB. MENDOZA CATH IN PLACE, DRAINING YELLOW URINE, OUTPUT IS 100ML LAST 
NIGHT ACCORDING TO NIGHT SHIFT RN. IV CATH NOTED TO LEFT AC 18G, FLUSHED, PATENT AND 
ASYMPTOMATIC, SL. IV CATH TO LEFT FA 22G, INFUSING NS AT 50ML/HR AND OCTREOTIDE 10ML/HR, 
ASYMPTOMATIC. SAFETY MEASURES ARE IN PLACE. WILL CONTINUE TO MONITOR.

## 2019-11-12 NOTE — NUR
EYES CLOSED, DOES NOT FOLLOW SIMPLE COMMANDS, DOES NOT RESPOND TO VOICE ONLY WITHDRAWS TO 
LIGHT PAIN. TURNED AND REPOSITIONED, SCD'S IN PLACE. WILL CONTINUE TO MONITOR

## 2019-11-12 NOTE — NUR
PATIENT TURNED AND REPOSITIONED, PATIENT STILL NOT RESPONDING TO NAME OR STERNAL RUB, 
PATIENT WILL FORCE MOUTH CLOSE WHEN PROVIDING ORAL CARE

## 2019-11-12 NOTE — NUR
DISCHARGE PLANNING:



DISCUSSED PLAN TO TRANSFER TO HIGHER LEVEL OF CARE TO PATIENT'S DAUGHTER JENNIFER MACIEL AT THE 
BEDSIDE, ABLE TO VERBALIZE UNDERSTANDING.



REFERRAL FAXED TO [facility] [phone].   TO FOLLOW UP.

-------------------------------------------------------------------------------

Addendum: 11/12/19 at 1119 by Sumaya Oliveros 

-------------------------------------------------------------------------------

CONTACTED [facility] [phone] TO FOLLOW UP REFERRAL SENT, NO ANSWER.   
DETAILED MESSAGE WITH CONTACT INFO LEFT.

## 2019-11-12 NOTE — NUR
CALLED DR YAO, REPORTED VITALS , /71, O2 SAT 91% ON VENTURI MASK FIO2 50%. 
RR 26. DR YAO SAID WILL TALK TO FAMILY REGARDING INTUBATION.

## 2019-11-12 NOTE — NUR
EKG MONITOR HAS LOTS OF ARTIFACTS AND ONE V FIB ALARM. MADE DR MONTENEGRO AWARE, 12 LEAD IS 
ORDERED.

## 2019-11-12 NOTE — NUR
PHONE CALL FROM CORONERS OFFICE; SPOKE WITH  POLLY REBOLLEDO. QUESTIONS ANSWERED.

BODY RELEASED. #[phone]

## 2019-11-12 NOTE — NUR
4MG MORPHINE IVP GIVEN PER DR MONTENEGRO'S ORDER FOR COMFORT CARE. FAMILY AT BEDSIDE. PT OPENED 
HIS EYES BUT NOT FOLLOWING COMMANDS, FAMILY ARE TALKING TO PT.

## 2019-11-12 NOTE — NUR
POST MORTEM CARE DONE.BODY IN BODY BAG.BAG TAG AND TOE TAG APPLIED.

BODY TAKEN BY [facility]  HOME.WITNESSES BY RN NESSA POWERS, NURSE 
SUPERVISOR.

## 2019-11-12 NOTE — NUR
DR COE CALLED AND ASKING HOW IS PT DOING. REPORTED LABS REGARDING H&H, BUN, CREAT. PT IS 
ALOC, BARELY RESPOND TO PAIN STIMULUS. NOT OPENING EYES. ASKED DR COE IF HE STILL GONNA DO 
THE EGD, DR COE SAID MAYBE TOMORROW. MADE CAROLIN AWARE PT VOMITED TODAY, AND NG TUBE RESIDUAL 
WAS 450ML. ASKED IF PT NEEDS LOW INTERMITTENT SUCTION. DR COE SAID NO. JUST HOLD FEEDING.

## 2019-11-12 NOTE — NUR
MENDOZA CATHETER IN PLACE, MINIMAL OUTPUT, MD AWARE. SMALL AMOUNT OF URINE IN MENDOZA BAG, CLEAR 
DARK LAURENCE URINE NOTED

## 2019-11-12 NOTE — NUR
PHONE CALL TO [facility] [phone], SPOKE WITH SUDEEP.MADE AWARE 
THAT BODY RELEASED BY . SILVANO IN 1-2 HOURS

CALLED JENNIFER MACIEL,  DAUGHTER, STACIA, [phone]. MADE AWARE OF THE ABOVE. SHE 
SAID OK NOT TO CALL HER WHEN THE MORTUARY REMOVES THE BODY.

## 2019-11-12 NOTE — NUR
CALLED RESIDENTS. DR MNOTENEGRO IN SX. DR YAO COVERING. MADE DR YAO AWARE THAT PT 
VOMITED TODAY BEFORE THE 12 LEAD. ASKED DR YAO IF HE CAN SEND THE EKG TO DR GARIBAY, 
DR YAO CAME AND CHECKED EKG. DR LATONIA AVILES AND BMP. ASKED DR YAO IF ANOTHER 
PARACENTESIS CAN BE DONE BECAUSE PT ABD IS DISTENDED. DR YAO SAID WILL LET DR MONTENEGRO 
KNOW.

-------------------------------------------------------------------------------

Addendum: 11/12/19 at 1752 by Rahul Vernon RN

-------------------------------------------------------------------------------

ALSO ASKED DR FERRELL IF HE CAN PRESCRIBE SOME PAIN MED FOR GRUNTING/GROANING BECAUSE THE 
ONLY MEDICATION FOR PAIN ON EMAR IS TYLENOL WHICH IS TOXIC TO LIVER. DR FERRELL SAID HE HAS 
TO ASK DR MONTENEGRO.

## 2019-11-13 LAB
ALBUMIN,BODY FLUID: 0.7 G/DL
LDH FLD-CCNC: 52 U/L
